# Patient Record
Sex: MALE | Race: BLACK OR AFRICAN AMERICAN | NOT HISPANIC OR LATINO | URBAN - METROPOLITAN AREA
[De-identification: names, ages, dates, MRNs, and addresses within clinical notes are randomized per-mention and may not be internally consistent; named-entity substitution may affect disease eponyms.]

---

## 2020-12-08 ENCOUNTER — EMERGENCY (EMERGENCY)
Facility: HOSPITAL | Age: 70
LOS: 1 days | Discharge: HOME | End: 2020-12-08
Attending: EMERGENCY MEDICINE | Admitting: EMERGENCY MEDICINE
Payer: MEDICARE

## 2020-12-08 VITALS — OXYGEN SATURATION: 97 % | HEART RATE: 100 BPM | RESPIRATION RATE: 16 BRPM

## 2020-12-08 VITALS
HEART RATE: 133 BPM | SYSTOLIC BLOOD PRESSURE: 128 MMHG | TEMPERATURE: 98 F | HEIGHT: 69 IN | OXYGEN SATURATION: 97 % | RESPIRATION RATE: 18 BRPM | WEIGHT: 199.96 LBS | DIASTOLIC BLOOD PRESSURE: 91 MMHG

## 2020-12-08 LAB — SARS-COV-2 RNA SPEC QL NAA+PROBE: DETECTED

## 2020-12-08 PROCEDURE — 99283 EMERGENCY DEPT VISIT LOW MDM: CPT

## 2020-12-08 PROCEDURE — 99284 EMERGENCY DEPT VISIT MOD MDM: CPT | Mod: CS

## 2020-12-08 PROCEDURE — U0003: CPT

## 2020-12-08 RX ORDER — AMLODIPINE BESYLATE 2.5 MG/1
1 TABLET ORAL
Qty: 0 | Refills: 0 | DISCHARGE

## 2020-12-08 NOTE — ED PROVIDER NOTE - OBJECTIVE STATEMENT
70 M hx of htn, COVID 19- sent in by Dr Sky to have covid swab prior to PFT's- px denies sob/cp/palpitations-  was admitted for covid, intubated w pna- prolonged hosp- says his hr has been 100- up to 119 on pulse ox- no sob  states he is gradually improving  no f/c no cough no sob  walked 10 blocks to get here  moderate severity

## 2020-12-08 NOTE — ED PROVIDER NOTE - PATIENT PORTAL LINK FT
You can access the FollowMyHealth Patient Portal offered by [facility] by registering at the following website: [email or web address]. By joining [employer]’s FollowMyHealth portal, you will also be able to view your health information using other applications (apps) compatible with our system.

## 2020-12-08 NOTE — ED ADULT NURSE NOTE - OBJECTIVE STATEMENT
pt presents to the ER requesting Covid swab, while being triaged pt's heart rate was found to be the 140's. pt states " I believe I was intubated for several days in the beginning of November for Covid." pt still taking medication for Covid from previous hospital discharge. pt denies any palpitations, CP, dizziness. pt has intermittent dry cough for "several" weeks, resp easy & unlabored, lungs clear.

## 2020-12-08 NOTE — ED PROVIDER NOTE - CLINICAL SUMMARY MEDICAL DECISION MAKING FREE TEXT BOX
HR elevated- but px without sx- noy po, recovering from covid- hr 108 now- no further m[place] indicated, px will increase po intake  si/sx to return, d/w patient

## 2020-12-08 NOTE — ED PROVIDER NOTE - NSFOLLOWUPINSTRUCTIONS_ED_ALL_ED_FT
TACHYCARDIA - General Information           Tachycardia     WHAT YOU NEED TO KNOW:    What do I need to know about tachycardia? Tachycardia (fast heart rate) is when your heart rate is 100 beats per minute or more at rest.    Heart Chambers         What causes or increases my risk for tachycardia? It is normal for the heart rate to increase with activity or exercise and then decrease when you stop. A fast heart rate at rest may be caused by any of the following: More specifically, cold or cough drugs, steroids, asthma medications, digitalis, and anti-arrhythmic drugs  •Anxiety, stress, or pain      •Fever      •Physical fatigue or strenuous exercise      •Large amounts of caffeine such as coffee, tea, and energy drinks      •Heavy alcohol use, cigarette smoking, or drugs such as cocaine      •Some medicines, such as inhalers, cold medicines, and hypertension medicines      •Increased thyroid hormone level      What other symptoms may I have with a fast heart rate? You may have no other symptoms with your fast heart rate, or you may have any of the following:   •Dizziness or lightheadedness      •Chest pain      •Fluttering or pounding heart      •Shortness of breath      How is a fast heart rate treated? You may need treatment if your fast heart rate continues or happens often. You may need medicine, procedures, or surgery. Your provider may also send you to a cardiologist for other tests.    How do I check my heart rate (pulse)? Your healthcare provider will show you how to check your pulse, and how often to check it. Write down how fast your pulse is and if it feels regular or like it is skipping beats. Also write down the activity you were doing if your heart rate is above 100. Bring the information with you to your follow-up appointment.     How to Take a Pulse         What can I do to help manage or prevent a fast heart rate?   •Talk to your healthcare provider about all your current medicines. He or she may change a medicine if it is causing your fast heart rate. Do not stop taking any medicine unless directed by your provider.      •Have less caffeine. Caffeine can increase your heart rate.      •Limit or do not drink alcohol. Alcohol can increase your heart rate. Ask your healthcare provider if it is okay for you to drink any alcohol. He or she can help you set limits for the number of drinks you have in 24 hours and in a week. A drink of alcohol is 12 ounces of beer, 5 ounces of wine, or 1½ ounces of liquor.      •Do not smoke. Nicotine and other chemicals in cigarettes can cause damage to your heart. Ask your healthcare provider for information if you currently smoke and need help to quit. E-cigarettes or smokeless tobacco still contain nicotine. Talk to your healthcare provider before you use these products.       •Do not use illegal drugs. Drugs such as meth and cocaine can increase your heart rate. Talk to your healthcare provider if you use illegal drugs and want to quit.      •Get more rest. Fatigue can cause your heart rate to increase. Ask your healthcare provider about the best exercise plan for you.      •Eat heart-healthy foods. These include fruits, vegetables, whole-grain breads, low-fat dairy products, beans, lean meats, and fish. Replace butter and margarine with heart-healthy oils such as olive oil and canola oil.             •Exercise regularly and maintain a healthy weight. Ask about the best exercise plan for you. Ask your healthcare provider what a healthy weight is for you. Ask him or her to help you create a safe weight loss plan if you are overweight.   Family Walking for Exercise           •Learn ways to cope with stress. Stress, fear, and anxiety can cause a fast heart rate. Your healthcare provider may recommend relaxation techniques and deep breathing exercises. Your healthcare provider may recommend you talk to someone about your stress or anxiety, such as a counselor or a trusted friend.      Call your local emergency number (911 in the [place]) or have someone call if:   •You have any of the following signs of a heart attack: ?Squeezing, pressure, or pain in your chest      ?You may also have any of the following: ?Discomfort or pain in your back, neck, jaw, stomach, or arm      ?Shortness of breath      ?Nausea or vomiting      ?Lightheadedness or a sudden cold sweat        •You cannot be woken.      When should I call my doctor?   •Your pulse is faster than you were told it should be.      •You have a fast heart rate often.      •You feel weak or dizzy.      •You have questions or concerns about your condition or care.      CARE AGREEMENT:    You have the right to help plan your care. Learn about your health condition and how it may be treated. Discuss treatment options with your healthcare providers to decide what care you want to receive. You always have the right to refuse treatment.        © Copyright "[employer] 2020           back to top                          © Copyright "[employer] 2020

## 2020-12-12 DIAGNOSIS — R00.0 TACHYCARDIA, UNSPECIFIED: ICD-10-CM

## 2020-12-12 DIAGNOSIS — I10 ESSENTIAL (PRIMARY) HYPERTENSION: ICD-10-CM

## 2020-12-12 DIAGNOSIS — Z79.899 OTHER LONG TERM (CURRENT) DRUG THERAPY: ICD-10-CM

## 2020-12-12 DIAGNOSIS — Z20.828 CONTACT WITH AND (SUSPECTED) EXPOSURE TO OTHER VIRAL COMMUNICABLE DISEASES: ICD-10-CM

## 2021-01-03 ENCOUNTER — OUTPATIENT (OUTPATIENT)
Dept: OUTPATIENT SERVICES | Facility: HOSPITAL | Age: 71
LOS: 1 days | End: 2021-01-03
Payer: MEDICARE

## 2021-01-03 PROCEDURE — 71046 X-RAY EXAM CHEST 2 VIEWS: CPT

## 2021-01-03 PROCEDURE — 71046 X-RAY EXAM CHEST 2 VIEWS: CPT | Mod: 26

## 2021-02-05 PROBLEM — I10 ESSENTIAL (PRIMARY) HYPERTENSION: Chronic | Status: ACTIVE | Noted: 2020-12-08

## 2021-02-05 PROBLEM — U07.1 COVID-19: Chronic | Status: ACTIVE | Noted: 2020-12-08

## 2021-08-17 ENCOUNTER — APPOINTMENT (OUTPATIENT)
Dept: INTERNAL MEDICINE | Facility: CLINIC | Age: 71
End: 2021-08-17
Payer: MEDICARE

## 2021-08-17 ENCOUNTER — LABORATORY RESULT (OUTPATIENT)
Age: 71
End: 2021-08-17

## 2021-08-17 ENCOUNTER — NON-APPOINTMENT (OUTPATIENT)
Age: 71
End: 2021-08-17

## 2021-08-17 VITALS
WEIGHT: 215 LBS | HEIGHT: 69 IN | DIASTOLIC BLOOD PRESSURE: 84 MMHG | HEART RATE: 109 BPM | BODY MASS INDEX: 31.84 KG/M2 | SYSTOLIC BLOOD PRESSURE: 128 MMHG

## 2021-08-17 DIAGNOSIS — Z98.890 OTHER SPECIFIED POSTPROCEDURAL STATES: ICD-10-CM

## 2021-08-17 DIAGNOSIS — Z12.5 ENCOUNTER FOR SCREENING FOR MALIGNANT NEOPLASM OF PROSTATE: ICD-10-CM

## 2021-08-17 DIAGNOSIS — R00.0 TACHYCARDIA, UNSPECIFIED: ICD-10-CM

## 2021-08-17 DIAGNOSIS — Z78.9 OTHER SPECIFIED HEALTH STATUS: ICD-10-CM

## 2021-08-17 DIAGNOSIS — R60.9 EDEMA, UNSPECIFIED: ICD-10-CM

## 2021-08-17 DIAGNOSIS — Z80.9 FAMILY HISTORY OF MALIGNANT NEOPLASM, UNSPECIFIED: ICD-10-CM

## 2021-08-17 PROCEDURE — 93000 ELECTROCARDIOGRAM COMPLETE: CPT

## 2021-08-17 PROCEDURE — 36415 COLL VENOUS BLD VENIPUNCTURE: CPT

## 2021-08-17 PROCEDURE — G0439: CPT

## 2021-08-17 NOTE — HISTORY OF PRESENT ILLNESS
[FreeTextEntry1] : here for evaluation but also concerned with frequent urination and possible prostate cancer [de-identified] : He has noted over the last month or so increasing urinary frequency for which he went to the emergency room about 5 weeks ago. He was referred to urologist who he has seen twice this month the last time being on August 12, 2020 which point he was told of a "massive" prostate and a PSA of 64. He had previously had a prostate evaluation in 2013 when he developed a urinary tract infection. He had a prostate biopsy which was reported then as negative. He had one episode 2 weeks ago of what appears to be overflow incontinence. He has been having persistent right groin pain the last several weeks. He reports upper back spine pain which is pretty constant unrelated to movement. He also reports a recent onset of left hand tremor.

## 2021-08-17 NOTE — PLAN
[FreeTextEntry1] : check labs and continue\par Continue meds\par Return 3 months\par Contact urologist

## 2021-08-17 NOTE — REVIEW OF SYSTEMS
[Recent Change In Weight] : ~T recent weight change [Nocturia] : nocturia [Frequency] : frequency [Back Pain] : back pain [Negative] : Respiratory [Hearing Loss] : no hearing loss [Chest Pain] : no chest pain [Palpitations] : no palpitations [Abdominal Pain] : no abdominal pain [Nausea] : no nausea [Vomiting] : no vomiting [Heartburn] : no heartburn [Dysuria] : no dysuria [Hematuria] : no hematuria [Muscle Weakness] : no muscle weakness [Skin Rash] : no skin rash [Headache] : no headache [Dizziness] : no dizziness [Unsteady Walk] : no ataxia [Easy Bleeding] : no easy bleeding [FreeTextEntry2] : wt gain [FreeTextEntry3] : Last eye exam 1 yr ago; +eyewear [FreeTextEntry8] : +kidney stone once around 1990 [de-identified] : +recent hand tremors

## 2021-08-17 NOTE — ASSESSMENT
[FreeTextEntry1] : CPE- healthy person up-to-date on health maintenance measures. Will check records for recommended vaccines. Urged weight loss through proper diet and regular exercising.\par \par \par Essential hypertension- Continue amlodipine although I note he has resting tachycardia and edema. Continue low salt diet. Urged to increase physical activity and increase efforts at weight loss.  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 he is a reliable historian. He has 2 issues presently: Urinary obstruction secondary to massive prostate and a likely possibility he has prostate cancer. Persistent mid back pain is worrisome as well as his new onset tremors he has noted. Will speak with urologist directly to expedite his workup.-Spoke with Landen. He will see pt ASAP.

## 2021-08-17 NOTE — PHYSICAL EXAM
[No Acute Distress] : no acute distress [Well Nourished] : well nourished [Well Developed] : well developed [Well-Appearing] : well-appearing [Normal Sclera/Conjunctiva] : normal sclera/conjunctiva [PERRL] : pupils equal round and reactive to light [EOMI] : extraocular movements intact [Normal Outer Ear/Nose] : the outer ears and nose were normal in appearance [Normal Oropharynx] : the oropharynx was normal [No JVD] : no jugular venous distention [No Lymphadenopathy] : no lymphadenopathy [Supple] : supple [No Respiratory Distress] : no respiratory distress  [Thyroid Normal, No Nodules] : the thyroid was normal and there were no nodules present [No Accessory Muscle Use] : no accessory muscle use [Clear to Auscultation] : lungs were clear to auscultation bilaterally [Normal Rate] : normal rate  [Regular Rhythm] : with a regular rhythm [Normal S1, S2] : normal S1 and S2 [No Murmur] : no murmur heard [No Carotid Bruits] : no carotid bruits [No Abdominal Bruit] : a ~M bruit was not heard ~T in the abdomen [No Varicosities] : no varicosities [Pedal Pulses Present] : the pedal pulses are present [No Palpable Aorta] : no palpable aorta [No Extremity Clubbing/Cyanosis] : no extremity clubbing/cyanosis [Soft] : abdomen soft [Non Tender] : non-tender [Non-distended] : non-distended [No Masses] : no abdominal mass palpated [No HSM] : no HSM [Normal Bowel Sounds] : normal bowel sounds [Normal Posterior Cervical Nodes] : no posterior cervical lymphadenopathy [Normal Anterior Cervical Nodes] : no anterior cervical lymphadenopathy [No CVA Tenderness] : no CVA  tenderness [No Spinal Tenderness] : no spinal tenderness [No Joint Swelling] : no joint swelling [Grossly Normal Strength/Tone] : grossly normal strength/tone [No Rash] : no rash [Coordination Grossly Intact] : coordination grossly intact [No Focal Deficits] : no focal deficits [Normal Gait] : normal gait [Deep Tendon Reflexes (DTR)] : deep tendon reflexes were 2+ and symmetric [Normal Affect] : the affect was normal [Normal Insight/Judgement] : insight and judgment were intact [de-identified] : no S4 [de-identified] : 1+edema R>L [de-identified] : +normal color vision; +fine hand tremors left greater than right; DTRs 1+ bilaterally

## 2021-08-18 PROBLEM — Z98.890 HISTORY OF CIRCUMCISION: Status: RESOLVED | Noted: 2021-08-17 | Resolved: 2021-08-18

## 2021-08-18 LAB
ALBUMIN SERPL ELPH-MCNC: 4.4 G/DL
ALP BLD-CCNC: 151 U/L
ALT SERPL-CCNC: 24 U/L
ANION GAP SERPL CALC-SCNC: 11 MMOL/L
AST SERPL-CCNC: 24 U/L
BASOPHILS # BLD AUTO: 0.02 K/UL
BASOPHILS NFR BLD AUTO: 0.2 %
BILIRUB DIRECT SERPL-MCNC: 0.1 MG/DL
BILIRUB INDIRECT SERPL-MCNC: 0.4 MG/DL
BILIRUB SERPL-MCNC: 0.6 MG/DL
BUN SERPL-MCNC: 16 MG/DL
CALCIUM SERPL-MCNC: 9.7 MG/DL
CHLORIDE SERPL-SCNC: 104 MMOL/L
CHOLEST SERPL-MCNC: 166 MG/DL
CO2 SERPL-SCNC: 25 MMOL/L
CREAT SERPL-MCNC: 1.54 MG/DL
EOSINOPHIL # BLD AUTO: 0.16 K/UL
EOSINOPHIL NFR BLD AUTO: 1.9 %
GLUCOSE SERPL-MCNC: 98 MG/DL
HCT VFR BLD CALC: 47.4 %
HDLC SERPL-MCNC: 51 MG/DL
HGB BLD-MCNC: 14.8 G/DL
IMM GRANULOCYTES NFR BLD AUTO: 0.4 %
LDLC SERPL CALC-MCNC: 84 MG/DL
LYMPHOCYTES # BLD AUTO: 2.18 K/UL
LYMPHOCYTES NFR BLD AUTO: 26.4 %
MAN DIFF?: NORMAL
MCHC RBC-ENTMCNC: 26.3 PG
MCHC RBC-ENTMCNC: 31.2 GM/DL
MCV RBC AUTO: 84.2 FL
MONOCYTES # BLD AUTO: 0.93 K/UL
MONOCYTES NFR BLD AUTO: 11.2 %
NEUTROPHILS # BLD AUTO: 4.95 K/UL
NEUTROPHILS NFR BLD AUTO: 59.9 %
NONHDLC SERPL-MCNC: 115 MG/DL
PLATELET # BLD AUTO: 265 K/UL
POTASSIUM SERPL-SCNC: 4.2 MMOL/L
PROT SERPL-MCNC: 7.4 G/DL
RBC # BLD: 5.63 M/UL
RBC # FLD: 14.6 %
SODIUM SERPL-SCNC: 140 MMOL/L
TRIGL SERPL-MCNC: 154 MG/DL
TSH SERPL-ACNC: 3.06 UIU/ML
WBC # FLD AUTO: 8.27 K/UL

## 2021-09-28 ENCOUNTER — APPOINTMENT (OUTPATIENT)
Dept: INTERNAL MEDICINE | Facility: CLINIC | Age: 71
End: 2021-09-28
Payer: MEDICARE

## 2021-09-28 VITALS
BODY MASS INDEX: 31.25 KG/M2 | SYSTOLIC BLOOD PRESSURE: 110 MMHG | DIASTOLIC BLOOD PRESSURE: 80 MMHG | WEIGHT: 211 LBS | HEIGHT: 69 IN | HEART RATE: 116 BPM

## 2021-09-28 DIAGNOSIS — Z00.00 ENCOUNTER FOR GENERAL ADULT MEDICAL EXAMINATION W/OUT ABNORMAL FINDINGS: ICD-10-CM

## 2021-09-28 DIAGNOSIS — N13.8 BENIGN PROSTATIC HYPERPLASIA WITH LOWER URINARY TRACT SYMPMS: ICD-10-CM

## 2021-09-28 DIAGNOSIS — N40.1 BENIGN PROSTATIC HYPERPLASIA WITH LOWER URINARY TRACT SYMPMS: ICD-10-CM

## 2021-09-28 PROCEDURE — 99215 OFFICE O/P EST HI 40 MIN: CPT

## 2021-09-28 RX ORDER — FINASTERIDE 5 MG/1
5 TABLET, FILM COATED ORAL DAILY
Qty: 90 | Refills: 3 | Status: ACTIVE | COMMUNITY
Start: 2021-09-28

## 2021-09-28 NOTE — ASSESSMENT
[FreeTextEntry4] : BPH with obstruction- in setting of markedly high PSA and MRI findings it is likely he has metastatic prostate cancer and he is aware of that possibility. Tylenol 2 tablets t.i.d. p.r.n. recommended currently for pain. His last EKG approximately 6 weeks ago was within normal limits. Await blood results from [facility] drawn today before final clearance.\par \par Essential hypertension- Continue amlodipine but at 5 mg daily as I am concerned with his resting tachycardia and edema. Continue low salt diet. Urged to increase physical activity and increase efforts at weight loss. Blood pressure control has been GOOD.

## 2021-09-28 NOTE — PHYSICAL EXAM
[No Acute Distress] : no acute distress [Well Nourished] : well nourished [Well Developed] : well developed [Well-Appearing] : well-appearing [Normal Sclera/Conjunctiva] : normal sclera/conjunctiva [PERRL] : pupils equal round and reactive to light [EOMI] : extraocular movements intact [Normal Outer Ear/Nose] : the outer ears and nose were normal in appearance [Normal Oropharynx] : the oropharynx was normal [No JVD] : no jugular venous distention [No Lymphadenopathy] : no lymphadenopathy [Supple] : supple [Thyroid Normal, No Nodules] : the thyroid was normal and there were no nodules present [No Respiratory Distress] : no respiratory distress  [No Accessory Muscle Use] : no accessory muscle use [Clear to Auscultation] : lungs were clear to auscultation bilaterally [Normal Rate] : normal rate  [Regular Rhythm] : with a regular rhythm [Normal S1, S2] : normal S1 and S2 [No Murmur] : no murmur heard [No Carotid Bruits] : no carotid bruits [No Abdominal Bruit] : a ~M bruit was not heard ~T in the abdomen [No Varicosities] : no varicosities [Pedal Pulses Present] : the pedal pulses are present [No Palpable Aorta] : no palpable aorta [No Extremity Clubbing/Cyanosis] : no extremity clubbing/cyanosis [Soft] : abdomen soft [Non Tender] : non-tender [Non-distended] : non-distended [No Masses] : no abdominal mass palpated [No HSM] : no HSM [Normal Bowel Sounds] : normal bowel sounds [Normal Posterior Cervical Nodes] : no posterior cervical lymphadenopathy [Normal Anterior Cervical Nodes] : no anterior cervical lymphadenopathy [No CVA Tenderness] : no CVA  tenderness [No Spinal Tenderness] : no spinal tenderness [No Joint Swelling] : no joint swelling [Grossly Normal Strength/Tone] : grossly normal strength/tone [No Rash] : no rash [Coordination Grossly Intact] : coordination grossly intact [No Focal Deficits] : no focal deficits [Normal Gait] : normal gait [Deep Tendon Reflexes (DTR)] : deep tendon reflexes were 2+ and symmetric [Normal Affect] : the affect was normal [Normal Insight/Judgement] : insight and judgment were intact [de-identified] : no S4 [de-identified] : 1+edema R>L [de-identified] : +normal color vision; +fine hand tremors left greater than right; DTRs 1+ bilaterally

## 2021-09-28 NOTE — HISTORY OF PRESENT ILLNESS
[No Pertinent Cardiac History] : no history of aortic stenosis, atrial fibrillation, coronary artery disease, recent myocardial infarction, or implantable device/pacemaker [No Pertinent Pulmonary History] : no history of asthma, COPD, sleep apnea, or smoking [No Adverse Anesthesia Reaction] : no adverse anesthesia reaction in self or family member [Chronic Anticoagulation] : no chronic anticoagulation [Chronic Kidney Disease] : no chronic kidney disease [Diabetes] : no diabetes [FreeTextEntry1] : prostate surgery [FreeTextEntry2] : 9/30/2021 [FreeTextEntry3] : HERIBERTO Schuster [FreeTextEntry4] : MRI showed invasive prostate cancer likely with mets to bone. D/w patient this morning. Feels tired with intermittent left anterior rib pain but otherwise well.

## 2021-09-28 NOTE — REVIEW OF SYSTEMS
[Recent Change In Weight] : ~T recent weight change [Nocturia] : nocturia [Frequency] : frequency [Back Pain] : back pain [Negative] : Respiratory [Hearing Loss] : no hearing loss [Chest Pain] : no chest pain [Palpitations] : no palpitations [Abdominal Pain] : no abdominal pain [Nausea] : no nausea [Vomiting] : no vomiting [Heartburn] : no heartburn [Dysuria] : no dysuria [Hematuria] : no hematuria [Muscle Weakness] : no muscle weakness [Skin Rash] : no skin rash [Headache] : no headache [Dizziness] : no dizziness [Unsteady Walk] : no ataxia [Easy Bleeding] : no easy bleeding [FreeTextEntry2] : wt gain [FreeTextEntry3] : Last eye exam 1+ yr ago; +eyewear [FreeTextEntry8] : +kidney stone once around 1990 [de-identified] : +recent hand tremors

## 2021-12-16 ENCOUNTER — APPOINTMENT (OUTPATIENT)
Dept: INTERNAL MEDICINE | Facility: CLINIC | Age: 71
End: 2021-12-16

## 2022-01-24 ENCOUNTER — RX RENEWAL (OUTPATIENT)
Age: 72
End: 2022-01-24

## 2022-02-01 ENCOUNTER — APPOINTMENT (OUTPATIENT)
Dept: INTERNAL MEDICINE | Facility: CLINIC | Age: 72
End: 2022-02-01

## 2022-02-16 ENCOUNTER — TRANSCRIPTION ENCOUNTER (OUTPATIENT)
Age: 72
End: 2022-02-16

## 2022-02-24 ENCOUNTER — APPOINTMENT (OUTPATIENT)
Dept: INTERNAL MEDICINE | Facility: CLINIC | Age: 72
End: 2022-02-24
Payer: MEDICARE

## 2022-02-24 PROCEDURE — 99213 OFFICE O/P EST LOW 20 MIN: CPT | Mod: 95

## 2022-02-24 RX ORDER — ABIRATERONE ACETATE 250 MG/1
250 TABLET, FILM COATED ORAL
Refills: 0 | Status: ACTIVE | COMMUNITY
Start: 2022-02-24

## 2022-02-24 NOTE — HISTORY OF PRESENT ILLNESS
[Home] : at home, [unfilled] , at the time of the visit. [Medical Office: ([facility])___] : at the medical office located in  [Verbal consent obtained from patient] : the patient, [unfilled] [FreeTextEntry1] : Here to assess blood pressure control [de-identified] : His medications have been changed throughout his encounters with [facility] physicians.  I noted his blood pressures have been rather high, perhaps because of his abiraterone, or alternatively because his amlodipine was decreased to 5 mg during his Covid episode.  In his most recent report from [facility], his physician increase his amlodipine to 10 mg daily. The patient reports that home monitoring blood pressure readings have been much better in the 110-130 range.  He reports no chest pain, palpitations, headaches, or dizziness.

## 2022-02-24 NOTE — REVIEW OF SYSTEMS
[Chest Pain] : no chest pain [Palpitations] : no palpitations [Headache] : no headache [Dizziness] : no dizziness

## 2022-02-24 NOTE — ASSESSMENT
[FreeTextEntry1] : Essential hypertension- Continue amlodipine 10 mg daily. I noted he had resting tachycardia and edema prior to amlodipine. Continue low salt diet. Urged to increase physical activity and increase efforts at weight loss.  Blood pressure control has been GOOD since restarting his amlodipine at the previous dose. Abiraterone is commonly associated with a rise in blood pressure.  Follow.\par \par Metastatic prostate cancer-I reviewed report from August 2021 through February 2022 from his oncologist.  Blood pressures and heart rates as well as weight noted.  Fortunately he has not had any significant weight gain with treatment.\par \par  Burow's Graft Text: The defect edges were debeveled with a #15 scalpel blade.  Given the location of the defect, shape of the defect, the proximity to free margins and the presence of a standing cone deformity a Burow's skin graft was deemed most appropriate. The standing cone was removed and this tissue was then trimmed to the shape of the primary defect. The adipose tissue was also removed until only dermis and epidermis were left.  The skin margins of the secondary defect were undermined to an appropriate distance in all directions utilizing iris scissors.  The secondary defect was closed with interrupted buried subcutaneous sutures.  The skin edges were then re-apposed with running  sutures.  The skin graft was then placed in the primary defect and oriented appropriately.

## 2022-05-12 ENCOUNTER — RX RENEWAL (OUTPATIENT)
Age: 72
End: 2022-05-12

## 2022-05-12 RX ORDER — TAMSULOSIN HYDROCHLORIDE 0.4 MG/1
0.4 CAPSULE ORAL
Qty: 90 | Refills: 3 | Status: ACTIVE | COMMUNITY
Start: 2021-08-17 | End: 1900-01-01

## 2022-06-14 RX ORDER — AMLODIPINE BESYLATE 10 MG/1
10 TABLET ORAL
Qty: 90 | Refills: 0 | Status: ACTIVE | COMMUNITY
Start: 2021-08-17 | End: 1900-01-01

## 2022-08-05 NOTE — ED ADULT TRIAGE NOTE - TEMPERATURE IN CELSIUS (DEGREES C)
Called lab again today about [address]. Spoke with Diallo Haile, she voiced this was sent out but they do not have an answer. She voiced this happens with [facility] that if something is wrong with the specimen they do not call us and let us know. She is voiced she would call [facility] and get back to me. Provided her with desk number and cell number as our office closes at 12 today. Diallo Haile voiced understanding. 36.8

## 2022-09-13 ENCOUNTER — APPOINTMENT (OUTPATIENT)
Dept: INTERNAL MEDICINE | Facility: CLINIC | Age: 72
End: 2022-09-13

## 2022-09-13 VITALS
HEART RATE: 86 BPM | HEIGHT: 69 IN | WEIGHT: 193 LBS | DIASTOLIC BLOOD PRESSURE: 80 MMHG | SYSTOLIC BLOOD PRESSURE: 130 MMHG | BODY MASS INDEX: 28.58 KG/M2

## 2022-09-13 DIAGNOSIS — D17.1 BENIGN LIPOMATOUS NEOPLASM OF SKIN AND SUBCUTANEOUS TISSUE OF TRUNK: ICD-10-CM

## 2022-09-13 PROCEDURE — 99213 OFFICE O/P EST LOW 20 MIN: CPT

## 2022-09-13 NOTE — ASSESSMENT
[FreeTextEntry1] : Chest wall mass appears to be very much benign and not related to his cancer.  Recommended continued follow-up with his oncologist.\par \par Essential hypertension- Continue amlodipine 10 mg daily. I noted he had resting tachycardia and edema prior to amlodipine. Continue low salt diet. Urged to increase physical activity and increase efforts at weight loss.  Blood pressure control has been GOOD since restarting his amlodipine. Abiraterone is commonly associated with a rise in blood pressure but this has not occurred.  Follow.\par \par Metastatic prostate cancer-I reviewed report from August 2021 through August 2022 from his oncologist with the patient.  Blood pressures and heart rates as well as weight noted.  \par \par At the end of the visit I spoke with patient about his lack of immunizations and strongly recommended, especially because of his cancer history, he received both pneumonia vaccines, Tdap vaccine, flu vaccine, and Shingrix vaccines.  He currently declines all but will consider them and discuss them with his oncologist.

## 2022-09-13 NOTE — HISTORY OF PRESENT ILLNESS
[FreeTextEntry8] : His girlfriend noted a prominence of his left chest wall 2 weeks ago but he feels it as always been there. Non-tender, not painful, and not growing. Lost 18 pounds in 1 year he feels it is from his cancer treatments.

## 2022-09-13 NOTE — PHYSICAL EXAM
[No JVD] : no jugular venous distention [Normal] : normal rate, regular rhythm, normal S1 and S2 and no murmur heard [No Carotid Bruits] : no carotid bruits [No Edema] : there was no peripheral edema [de-identified] : + Small fatty mass overlying left lower ribs at midclavicular line

## 2023-03-22 ENCOUNTER — NON-APPOINTMENT (OUTPATIENT)
Age: 73
End: 2023-03-22

## 2023-03-22 RX ORDER — DICLOFENAC SODIUM 75 MG/1
75 TABLET, DELAYED RELEASE ORAL
Qty: 60 | Refills: 0 | Status: DISCONTINUED | COMMUNITY
Start: 2023-01-06 | End: 2023-03-22

## 2023-03-29 ENCOUNTER — APPOINTMENT (OUTPATIENT)
Dept: INTERNAL MEDICINE | Facility: CLINIC | Age: 73
End: 2023-03-29
Payer: MEDICARE

## 2023-03-29 VITALS
SYSTOLIC BLOOD PRESSURE: 120 MMHG | BODY MASS INDEX: 29.03 KG/M2 | HEART RATE: 106 BPM | WEIGHT: 196 LBS | DIASTOLIC BLOOD PRESSURE: 80 MMHG | HEIGHT: 69 IN

## 2023-03-29 DIAGNOSIS — C61 MALIGNANT NEOPLASM OF PROSTATE: ICD-10-CM

## 2023-03-29 DIAGNOSIS — I10 ESSENTIAL (PRIMARY) HYPERTENSION: ICD-10-CM

## 2023-03-29 DIAGNOSIS — C79.51 MALIGNANT NEOPLASM OF PROSTATE: ICD-10-CM

## 2023-03-29 DIAGNOSIS — R07.9 CHEST PAIN, UNSPECIFIED: ICD-10-CM

## 2023-03-29 PROCEDURE — 99495 TRANSJ CARE MGMT MOD F2F 14D: CPT

## 2023-03-29 NOTE — PHYSICAL EXAM
[No JVD] : no jugular venous distention [Normal] : normal rate, regular rhythm, normal S1 and S2 and no murmur heard [de-identified] : No S4 [de-identified] : +fine hand tremors left greater than right; DTRs 1+ bilaterally

## 2023-03-29 NOTE — ASSESSMENT
[FreeTextEntry1] : Chest pain– we discussed the various causes of noncardiac chest pain.  We discussed esophageal chest pains in detail as he does complain of occasional burping.  We reviewed his stress test and echocardiogram in great detail reassuring him that he has no evidence of significant coronary artery disease.  A trial of famotidine if his symptoms become more persistent was discussed. As his episodes now are much more infrequent he might try some Mylanta as directed. Continue present same meds. I see no need currently for cardiology referral as he has no evidence of significant coronary or cardiac disease.\par \par His metastatic prostate cancer is being treated by his oncologist and bone lesions are obvious on his reported CTA of the chest. He indicates that his cancer is not responding as well any longer to his previous treatments and a clinical trial was discussed.  He has deferred that option but will continue to follow-up with the oncologist.

## 2023-03-29 NOTE — HISTORY OF PRESENT ILLNESS
[Post-hospitalization from ___ Hospital] : Post-hospitalization from [unfilled] Hospital [Admitted on: ___] : The patient was admitted on [unfilled] [Discharged on ___] : discharged on [unfilled] [Discharge Summary] : discharge summary [Pertinent Labs] : pertinent labs [Discharge Med List] : discharge medication list [Other: ____] : [unfilled] [Med Reconciliation] : medication reconciliation has been completed [Patient Contacted By: ____] : and contacted by [unfilled] [FreeTextEntry2] : The patient was admitted for chest pain evaluation. He had constant tightness across his entire chest x 3 days sans n/v. He has sweats sec to his meds. +weakness, sob. He underwent CTA of chest, ECHO, and SPECT stress test which was negative for significant coronary artery disease.  Given the negative stress test there was no indication for cardiac cath and hence that was NOT done.
